# Patient Record
Sex: MALE | Race: WHITE | Employment: FULL TIME | URBAN - METROPOLITAN AREA
[De-identification: names, ages, dates, MRNs, and addresses within clinical notes are randomized per-mention and may not be internally consistent; named-entity substitution may affect disease eponyms.]

---

## 2019-07-05 ENCOUNTER — HOSPITAL ENCOUNTER (OUTPATIENT)
Dept: CT IMAGING | Age: 44
Discharge: HOME | End: 2019-07-05
Attending: ORTHOPAEDIC SURGERY
Payer: COMMERCIAL

## 2019-07-05 DIAGNOSIS — M43.16 SPONDYLOLISTHESIS OF LUMBAR REGION: ICD-10-CM

## 2019-07-05 PROCEDURE — 72131 CT LUMBAR SPINE W/O DYE: CPT

## 2019-07-24 ENCOUNTER — HOSPITAL ENCOUNTER (OUTPATIENT)
Dept: PREADMISSION TESTING | Age: 44
Discharge: HOME | End: 2019-07-24
Payer: COMMERCIAL

## 2019-07-24 VITALS
BODY MASS INDEX: 42.7 KG/M2 | TEMPERATURE: 98 F | WEIGHT: 305 LBS | HEART RATE: 71 BPM | HEIGHT: 71 IN | SYSTOLIC BLOOD PRESSURE: 119 MMHG | DIASTOLIC BLOOD PRESSURE: 86 MMHG

## 2019-07-24 DIAGNOSIS — E66.01 MORBID OBESITY WITH BMI OF 40.0-44.9, ADULT (HCC): Primary | ICD-10-CM

## 2019-07-24 LAB
ABO + RH BLD: NORMAL
APPEARANCE UR: CLEAR
BACTERIA URNS QL MICRO: NEGATIVE /HPF
BILIRUB UR QL: NEGATIVE
BLOOD GROUP ANTIBODIES SERPL: NORMAL
COLOR UR: NORMAL
EPITH CASTS URNS QL MICRO: NORMAL /LPF
EST. AVERAGE GLUCOSE BLD GHB EST-MCNC: 123 MG/DL
GLUCOSE UR STRIP.AUTO-MCNC: NEGATIVE MG/DL
HBA1C MFR BLD: 5.9 % (ref 4.2–6.3)
HGB UR QL STRIP: NEGATIVE
HYALINE CASTS URNS QL MICRO: NORMAL /LPF (ref 0–5)
INR PPP: 1 (ref 0.9–1.1)
KETONES UR QL STRIP.AUTO: NEGATIVE MG/DL
LEUKOCYTE ESTERASE UR QL STRIP.AUTO: NEGATIVE
NITRITE UR QL STRIP.AUTO: NEGATIVE
PH UR STRIP: 6 [PH] (ref 5–8)
PROT UR STRIP-MCNC: NEGATIVE MG/DL
PROTHROMBIN TIME: 10.1 SEC (ref 9–11.1)
RBC #/AREA URNS HPF: NORMAL /HPF (ref 0–5)
SP GR UR REFRACTOMETRY: 1.02 (ref 1–1.03)
SPECIMEN EXP DATE BLD: NORMAL
UA: UC IF INDICATED,UAUC: NORMAL
UROBILINOGEN UR QL STRIP.AUTO: 1 EU/DL (ref 0.2–1)
WBC URNS QL MICRO: NORMAL /HPF (ref 0–4)

## 2019-07-24 PROCEDURE — 83036 HEMOGLOBIN GLYCOSYLATED A1C: CPT

## 2019-07-24 PROCEDURE — 36415 COLL VENOUS BLD VENIPUNCTURE: CPT

## 2019-07-24 PROCEDURE — 86900 BLOOD TYPING SEROLOGIC ABO: CPT

## 2019-07-24 PROCEDURE — 85610 PROTHROMBIN TIME: CPT

## 2019-07-24 PROCEDURE — 81001 URINALYSIS AUTO W/SCOPE: CPT

## 2019-07-24 RX ORDER — CHOLECALCIFEROL (VITAMIN D3) 125 MCG
CAPSULE ORAL AS NEEDED
COMMUNITY

## 2019-07-24 NOTE — PERIOP NOTES
PREOPERATIVE INSTRUCTIONS REVIEWED WITH PATIENT. PATIENT GIVEN TWO--SIX BOTTLES CHG SOAP. INSTRUCTIONS REVIEWED ON USE OF CHG SOAP. PATIENT GIVEN SSI INFECTIONS SHEET; MRSA/MSSA TREATMENT INSTRUCTION SHEET GIVEN WITH AN EXPLANATION TO PATIENT THAT THEY WILL BE NOTIFIED IF TREATMENT INSTRUCTIONS NEED TO BE INITIATED. PATIENT WAS GIVEN THE OPPORTUNITY TO ASK QUESTIONS; REGARDING THE INFORMATION PROVIDED. PREOP DIET AND NUTRITION UPDATED GUIDELINES/ INSTRUCTIONS REVIEWED WITH PATIENT. PATIENT ADVISED THAT THEY MAY DRINK CLEAR LIQUIDS (12 OZ/4 HOURS) UP UNTIL ONE HOUR PRIOR TO ARRIVAL DAY OF SURGERY. PATIENT GIVEN INSTRUCTIONS REGARDING INCENTIVE SPIROMETRY USE IN PREOP SPINE CLASS. PATIENT ATTENDED PREOP SPINE CLASS TODAY.    1309 called EDUARDO RE: WEIGHT:  305 LBS.

## 2019-07-25 ENCOUNTER — ANESTHESIA EVENT (OUTPATIENT)
Dept: SURGERY | Age: 44
DRG: 454 | End: 2019-07-25
Payer: COMMERCIAL

## 2019-07-25 PROBLEM — E66.01 MORBID OBESITY WITH BMI OF 40.0-44.9, ADULT (HCC): Status: ACTIVE | Noted: 2019-07-25

## 2019-07-25 LAB
BACTERIA SPEC CULT: NORMAL
BACTERIA SPEC CULT: NORMAL
SERVICE CMNT-IMP: NORMAL

## 2019-07-31 ENCOUNTER — APPOINTMENT (OUTPATIENT)
Dept: GENERAL RADIOLOGY | Age: 44
DRG: 454 | End: 2019-07-31
Attending: ORTHOPAEDIC SURGERY
Payer: COMMERCIAL

## 2019-07-31 ENCOUNTER — HOSPITAL ENCOUNTER (INPATIENT)
Age: 44
LOS: 1 days | Discharge: HOME | DRG: 454 | End: 2019-08-01
Attending: ORTHOPAEDIC SURGERY | Admitting: ORTHOPAEDIC SURGERY
Payer: COMMERCIAL

## 2019-07-31 ENCOUNTER — ANESTHESIA (OUTPATIENT)
Dept: SURGERY | Age: 44
DRG: 454 | End: 2019-07-31
Payer: COMMERCIAL

## 2019-07-31 DIAGNOSIS — M48.061 SPINAL STENOSIS OF LUMBAR REGION WITHOUT NEUROGENIC CLAUDICATION: Primary | ICD-10-CM

## 2019-07-31 PROCEDURE — 74011250637 HC RX REV CODE- 250/637: Performed by: ORTHOPAEDIC SURGERY

## 2019-07-31 PROCEDURE — 77030004391 HC BUR FLUT MEDT -C: Performed by: ORTHOPAEDIC SURGERY

## 2019-07-31 PROCEDURE — 77030038808 HC SPCR TLIF HLLYWD NM INLC -I1: Performed by: ORTHOPAEDIC SURGERY

## 2019-07-31 PROCEDURE — 77030026438 HC STYL ET INTUB CARD -A: Performed by: NURSE ANESTHETIST, CERTIFIED REGISTERED

## 2019-07-31 PROCEDURE — 01NB0ZZ RELEASE LUMBAR NERVE, OPEN APPROACH: ICD-10-PCS | Performed by: ORTHOPAEDIC SURGERY

## 2019-07-31 PROCEDURE — 77030019908 HC STETH ESOPH SIMS -A: Performed by: NURSE ANESTHETIST, CERTIFIED REGISTERED

## 2019-07-31 PROCEDURE — 76010000175 HC OR TIME 4 TO 4.5 HR INTENSV-TIER 1: Performed by: ORTHOPAEDIC SURGERY

## 2019-07-31 PROCEDURE — 74011000272 HC RX REV CODE- 272: Performed by: ORTHOPAEDIC SURGERY

## 2019-07-31 PROCEDURE — 76210000016 HC OR PH I REC 1 TO 1.5 HR: Performed by: ORTHOPAEDIC SURGERY

## 2019-07-31 PROCEDURE — 77030008684 HC TU ET CUF COVD -B: Performed by: NURSE ANESTHETIST, CERTIFIED REGISTERED

## 2019-07-31 PROCEDURE — 72100 X-RAY EXAM L-S SPINE 2/3 VWS: CPT

## 2019-07-31 PROCEDURE — 77030012406 HC DRN WND PENRS BARD -A: Performed by: ORTHOPAEDIC SURGERY

## 2019-07-31 PROCEDURE — 77030040356 HC CORD BPLR FRCP COVD -A: Performed by: ORTHOPAEDIC SURGERY

## 2019-07-31 PROCEDURE — 77030029099 HC BN WAX SSPC -A: Performed by: ORTHOPAEDIC SURGERY

## 2019-07-31 PROCEDURE — 0SG3071 FUSION OF LUMBOSACRAL JOINT WITH AUTOLOGOUS TISSUE SUBSTITUTE, POSTERIOR APPROACH, POSTERIOR COLUMN, OPEN APPROACH: ICD-10-PCS | Performed by: ORTHOPAEDIC SURGERY

## 2019-07-31 PROCEDURE — 77030031139 HC SUT VCRL2 J&J -A: Performed by: ORTHOPAEDIC SURGERY

## 2019-07-31 PROCEDURE — 0SG0071 FUSION OF LUMBAR VERTEBRAL JOINT WITH AUTOLOGOUS TISSUE SUBSTITUTE, POSTERIOR APPROACH, POSTERIOR COLUMN, OPEN APPROACH: ICD-10-PCS | Performed by: ORTHOPAEDIC SURGERY

## 2019-07-31 PROCEDURE — 0SG30AJ FUSION OF LUMBOSACRAL JOINT WITH INTERBODY FUSION DEVICE, POSTERIOR APPROACH, ANTERIOR COLUMN, OPEN APPROACH: ICD-10-PCS | Performed by: ORTHOPAEDIC SURGERY

## 2019-07-31 PROCEDURE — C1713 ANCHOR/SCREW BN/BN,TIS/BN: HCPCS | Performed by: ORTHOPAEDIC SURGERY

## 2019-07-31 PROCEDURE — 77030018836 HC SOL IRR NACL ICUM -A: Performed by: ORTHOPAEDIC SURGERY

## 2019-07-31 PROCEDURE — 77030040361 HC SLV COMPR DVT MDII -B: Performed by: ORTHOPAEDIC SURGERY

## 2019-07-31 PROCEDURE — 77030035129: Performed by: ORTHOPAEDIC SURGERY

## 2019-07-31 PROCEDURE — 77030039266 HC ADH SKN EXOFIN S2SG -A: Performed by: ORTHOPAEDIC SURGERY

## 2019-07-31 PROCEDURE — 74011250636 HC RX REV CODE- 250/636

## 2019-07-31 PROCEDURE — 77030039789 HC GRFT BN DBM OSTEO INLC -G: Performed by: ORTHOPAEDIC SURGERY

## 2019-07-31 PROCEDURE — 74011000250 HC RX REV CODE- 250

## 2019-07-31 PROCEDURE — 77030003029 HC SUT VCRL J&J -B: Performed by: ORTHOPAEDIC SURGERY

## 2019-07-31 PROCEDURE — 74011250636 HC RX REV CODE- 250/636: Performed by: ORTHOPAEDIC SURGERY

## 2019-07-31 PROCEDURE — 8E0W0CZ ROBOTIC ASSISTED PROCEDURE OF TRUNK REGION, OPEN APPROACH: ICD-10-PCS | Performed by: ORTHOPAEDIC SURGERY

## 2019-07-31 PROCEDURE — 74011000250 HC RX REV CODE- 250: Performed by: ORTHOPAEDIC SURGERY

## 2019-07-31 PROCEDURE — 77030011264 HC ELECTRD BLD EXT COVD -A: Performed by: ORTHOPAEDIC SURGERY

## 2019-07-31 PROCEDURE — 07DR0ZZ EXTRACTION OF ILIAC BONE MARROW, OPEN APPROACH: ICD-10-PCS | Performed by: ORTHOPAEDIC SURGERY

## 2019-07-31 PROCEDURE — 77030020782 HC GWN BAIR PAWS FLX 3M -B

## 2019-07-31 PROCEDURE — 77030013079 HC BLNKT BAIR HGGR 3M -A: Performed by: NURSE ANESTHETIST, CERTIFIED REGISTERED

## 2019-07-31 PROCEDURE — 77030039456 HC KT SPINE DISP MAZR -G1: Performed by: ORTHOPAEDIC SURGERY

## 2019-07-31 PROCEDURE — 0ST40ZZ RESECTION OF LUMBOSACRAL DISC, OPEN APPROACH: ICD-10-PCS | Performed by: ORTHOPAEDIC SURGERY

## 2019-07-31 PROCEDURE — 77030005515 HC CATH URETH FOL14 BARD -B: Performed by: ORTHOPAEDIC SURGERY

## 2019-07-31 PROCEDURE — 74011250636 HC RX REV CODE- 250/636: Performed by: ANESTHESIOLOGY

## 2019-07-31 PROCEDURE — 76060000039 HC ANESTHESIA 4 TO 4.5 HR: Performed by: ORTHOPAEDIC SURGERY

## 2019-07-31 PROCEDURE — 77030037722 HC GRFT BN SUB BGNX -G1: Performed by: ORTHOPAEDIC SURGERY

## 2019-07-31 PROCEDURE — 77030014647 HC SEAL FBRN TISSL BAXT -D: Performed by: ORTHOPAEDIC SURGERY

## 2019-07-31 PROCEDURE — 65270000029 HC RM PRIVATE

## 2019-07-31 DEVICE — HOLLYWOOD VI NM STERILE 11MM X 30MM X 10MM, LORDOTIC
Type: IMPLANTABLE DEVICE | Site: SPINE LUMBAR | Status: FUNCTIONAL
Brand: SEASPINE SPACER SYSTEM - HOLLYWOOD VI NANOMETALENE

## 2019-07-31 DEVICE — Z DUP USE 2731811 GRAFT BNE LG OSTEOSTRAND: Type: IMPLANTABLE DEVICE | Site: SPINE LUMBAR | Status: FUNCTIONAL

## 2019-07-31 DEVICE — SET SCREW, T30
Type: IMPLANTABLE DEVICE | Site: SPINE LUMBAR | Status: FUNCTIONAL
Brand: TAURUS

## 2019-07-31 DEVICE — SCREW, PEDICLE DL CANNULATED, Ø6.5X50MM
Type: IMPLANTABLE DEVICE | Site: SPINE LUMBAR | Status: FUNCTIONAL
Brand: TAURUS

## 2019-07-31 DEVICE — SCREW, PEDICLE DL CANNULATED, Ø7.5X45MM
Type: IMPLANTABLE DEVICE | Site: SPINE LUMBAR | Status: FUNCTIONAL
Brand: TAURUS

## 2019-07-31 DEVICE — 5.5MM CURVED ROD 45MM TI ALLOY
Type: IMPLANTABLE DEVICE | Site: SPINE LUMBAR | Status: FUNCTIONAL
Brand: TAURUS

## 2019-07-31 DEVICE — HEADBODY, ASSEMBLY, STANDARD
Type: IMPLANTABLE DEVICE | Site: SPINE LUMBAR | Status: FUNCTIONAL
Brand: TAURUS

## 2019-07-31 RX ORDER — CYCLOBENZAPRINE HCL 10 MG
10 TABLET ORAL
Status: DISCONTINUED | OUTPATIENT
Start: 2019-07-31 | End: 2019-08-01 | Stop reason: HOSPADM

## 2019-07-31 RX ORDER — SODIUM CHLORIDE, SODIUM LACTATE, POTASSIUM CHLORIDE, CALCIUM CHLORIDE 600; 310; 30; 20 MG/100ML; MG/100ML; MG/100ML; MG/100ML
INJECTION, SOLUTION INTRAVENOUS
Status: DISCONTINUED | OUTPATIENT
Start: 2019-07-31 | End: 2019-07-31 | Stop reason: HOSPADM

## 2019-07-31 RX ORDER — SODIUM CHLORIDE, SODIUM LACTATE, POTASSIUM CHLORIDE, CALCIUM CHLORIDE 600; 310; 30; 20 MG/100ML; MG/100ML; MG/100ML; MG/100ML
50 INJECTION, SOLUTION INTRAVENOUS CONTINUOUS
Status: DISCONTINUED | OUTPATIENT
Start: 2019-07-31 | End: 2019-07-31 | Stop reason: HOSPADM

## 2019-07-31 RX ORDER — MORPHINE SULFATE 10 MG/ML
2 INJECTION, SOLUTION INTRAMUSCULAR; INTRAVENOUS
Status: DISCONTINUED | OUTPATIENT
Start: 2019-07-31 | End: 2019-07-31 | Stop reason: HOSPADM

## 2019-07-31 RX ORDER — ONDANSETRON 2 MG/ML
4 INJECTION INTRAMUSCULAR; INTRAVENOUS AS NEEDED
Status: DISCONTINUED | OUTPATIENT
Start: 2019-07-31 | End: 2019-07-31 | Stop reason: HOSPADM

## 2019-07-31 RX ORDER — DEXAMETHASONE SODIUM PHOSPHATE 4 MG/ML
INJECTION, SOLUTION INTRA-ARTICULAR; INTRALESIONAL; INTRAMUSCULAR; INTRAVENOUS; SOFT TISSUE AS NEEDED
Status: DISCONTINUED | OUTPATIENT
Start: 2019-07-31 | End: 2019-07-31 | Stop reason: HOSPADM

## 2019-07-31 RX ORDER — LIDOCAINE HYDROCHLORIDE 10 MG/ML
0.1 INJECTION, SOLUTION EPIDURAL; INFILTRATION; INTRACAUDAL; PERINEURAL AS NEEDED
Status: DISCONTINUED | OUTPATIENT
Start: 2019-07-31 | End: 2019-07-31 | Stop reason: HOSPADM

## 2019-07-31 RX ORDER — SUCCINYLCHOLINE CHLORIDE 20 MG/ML
INJECTION INTRAMUSCULAR; INTRAVENOUS AS NEEDED
Status: DISCONTINUED | OUTPATIENT
Start: 2019-07-31 | End: 2019-07-31 | Stop reason: HOSPADM

## 2019-07-31 RX ORDER — MIDAZOLAM HYDROCHLORIDE 1 MG/ML
INJECTION, SOLUTION INTRAMUSCULAR; INTRAVENOUS AS NEEDED
Status: DISCONTINUED | OUTPATIENT
Start: 2019-07-31 | End: 2019-07-31 | Stop reason: HOSPADM

## 2019-07-31 RX ORDER — FENTANYL CITRATE 50 UG/ML
INJECTION, SOLUTION INTRAMUSCULAR; INTRAVENOUS AS NEEDED
Status: DISCONTINUED | OUTPATIENT
Start: 2019-07-31 | End: 2019-07-31 | Stop reason: HOSPADM

## 2019-07-31 RX ORDER — FENTANYL CITRATE 50 UG/ML
25 INJECTION, SOLUTION INTRAMUSCULAR; INTRAVENOUS
Status: COMPLETED | OUTPATIENT
Start: 2019-07-31 | End: 2019-07-31

## 2019-07-31 RX ORDER — HYDROMORPHONE HYDROCHLORIDE 2 MG/ML
INJECTION, SOLUTION INTRAMUSCULAR; INTRAVENOUS; SUBCUTANEOUS AS NEEDED
Status: DISCONTINUED | OUTPATIENT
Start: 2019-07-31 | End: 2019-07-31 | Stop reason: HOSPADM

## 2019-07-31 RX ORDER — VANCOMYCIN HYDROCHLORIDE 1 G/20ML
INJECTION, POWDER, LYOPHILIZED, FOR SOLUTION INTRAVENOUS AS NEEDED
Status: DISCONTINUED | OUTPATIENT
Start: 2019-07-31 | End: 2019-07-31 | Stop reason: HOSPADM

## 2019-07-31 RX ORDER — OXYCODONE HYDROCHLORIDE 5 MG/1
5 TABLET ORAL
Status: DISCONTINUED | OUTPATIENT
Start: 2019-07-31 | End: 2019-08-01 | Stop reason: HOSPADM

## 2019-07-31 RX ORDER — BUPIVACAINE HYDROCHLORIDE AND EPINEPHRINE 5; 5 MG/ML; UG/ML
INJECTION, SOLUTION EPIDURAL; INTRACAUDAL; PERINEURAL AS NEEDED
Status: DISCONTINUED | OUTPATIENT
Start: 2019-07-31 | End: 2019-07-31 | Stop reason: HOSPADM

## 2019-07-31 RX ORDER — OXYCODONE HYDROCHLORIDE 5 MG/1
10 TABLET ORAL
Status: DISCONTINUED | OUTPATIENT
Start: 2019-07-31 | End: 2019-08-01 | Stop reason: HOSPADM

## 2019-07-31 RX ORDER — HYDROMORPHONE HYDROCHLORIDE 1 MG/ML
0.2 INJECTION, SOLUTION INTRAMUSCULAR; INTRAVENOUS; SUBCUTANEOUS
Status: DISCONTINUED | OUTPATIENT
Start: 2019-07-31 | End: 2019-07-31 | Stop reason: HOSPADM

## 2019-07-31 RX ORDER — DEXMEDETOMIDINE HYDROCHLORIDE 4 UG/ML
INJECTION, SOLUTION INTRAVENOUS AS NEEDED
Status: DISCONTINUED | OUTPATIENT
Start: 2019-07-31 | End: 2019-07-31 | Stop reason: HOSPADM

## 2019-07-31 RX ORDER — POLYETHYLENE GLYCOL 3350 17 G/17G
17 POWDER, FOR SOLUTION ORAL DAILY
Status: DISCONTINUED | OUTPATIENT
Start: 2019-08-01 | End: 2019-08-01 | Stop reason: HOSPADM

## 2019-07-31 RX ORDER — PROPOFOL 10 MG/ML
INJECTION, EMULSION INTRAVENOUS AS NEEDED
Status: DISCONTINUED | OUTPATIENT
Start: 2019-07-31 | End: 2019-07-31 | Stop reason: HOSPADM

## 2019-07-31 RX ORDER — SODIUM CHLORIDE 0.9 % (FLUSH) 0.9 %
5-40 SYRINGE (ML) INJECTION EVERY 8 HOURS
Status: DISCONTINUED | OUTPATIENT
Start: 2019-07-31 | End: 2019-08-01 | Stop reason: HOSPADM

## 2019-07-31 RX ORDER — ACETAMINOPHEN 325 MG/1
650 TABLET ORAL EVERY 6 HOURS
Status: DISCONTINUED | OUTPATIENT
Start: 2019-07-31 | End: 2019-08-01 | Stop reason: HOSPADM

## 2019-07-31 RX ORDER — SODIUM CHLORIDE 9 MG/ML
125 INJECTION, SOLUTION INTRAVENOUS CONTINUOUS
Status: DISCONTINUED | OUTPATIENT
Start: 2019-07-31 | End: 2019-08-01 | Stop reason: HOSPADM

## 2019-07-31 RX ORDER — DIPHENHYDRAMINE HCL 12.5MG/5ML
12.5 ELIXIR ORAL
Status: DISCONTINUED | OUTPATIENT
Start: 2019-07-31 | End: 2019-08-01 | Stop reason: HOSPADM

## 2019-07-31 RX ORDER — SODIUM CHLORIDE 0.9 % (FLUSH) 0.9 %
5-40 SYRINGE (ML) INJECTION EVERY 8 HOURS
Status: DISCONTINUED | OUTPATIENT
Start: 2019-07-31 | End: 2019-07-31 | Stop reason: HOSPADM

## 2019-07-31 RX ORDER — ROCURONIUM BROMIDE 10 MG/ML
INJECTION, SOLUTION INTRAVENOUS AS NEEDED
Status: DISCONTINUED | OUTPATIENT
Start: 2019-07-31 | End: 2019-07-31 | Stop reason: HOSPADM

## 2019-07-31 RX ORDER — CEFAZOLIN SODIUM/WATER 2 G/20 ML
2 SYRINGE (ML) INTRAVENOUS EVERY 8 HOURS
Status: COMPLETED | OUTPATIENT
Start: 2019-07-31 | End: 2019-08-01

## 2019-07-31 RX ORDER — AMOXICILLIN 250 MG
1 CAPSULE ORAL 2 TIMES DAILY
Status: DISCONTINUED | OUTPATIENT
Start: 2019-07-31 | End: 2019-08-01 | Stop reason: HOSPADM

## 2019-07-31 RX ORDER — FACIAL-BODY WIPES
10 EACH TOPICAL DAILY PRN
Status: DISCONTINUED | OUTPATIENT
Start: 2019-08-02 | End: 2019-08-01 | Stop reason: HOSPADM

## 2019-07-31 RX ORDER — SODIUM CHLORIDE 0.9 % (FLUSH) 0.9 %
5-40 SYRINGE (ML) INJECTION AS NEEDED
Status: DISCONTINUED | OUTPATIENT
Start: 2019-07-31 | End: 2019-07-31 | Stop reason: HOSPADM

## 2019-07-31 RX ORDER — SODIUM CHLORIDE 0.9 % (FLUSH) 0.9 %
5-40 SYRINGE (ML) INJECTION AS NEEDED
Status: DISCONTINUED | OUTPATIENT
Start: 2019-07-31 | End: 2019-08-01 | Stop reason: HOSPADM

## 2019-07-31 RX ORDER — HYDROMORPHONE HCL/0.9% NACL/PF 0.5 MG/ML
PLASTIC BAG, INJECTION (ML) INTRAVENOUS CONTINUOUS
Status: DISCONTINUED | OUTPATIENT
Start: 2019-07-31 | End: 2019-08-01 | Stop reason: HOSPADM

## 2019-07-31 RX ORDER — ONDANSETRON 2 MG/ML
INJECTION INTRAMUSCULAR; INTRAVENOUS AS NEEDED
Status: DISCONTINUED | OUTPATIENT
Start: 2019-07-31 | End: 2019-07-31 | Stop reason: HOSPADM

## 2019-07-31 RX ORDER — NALOXONE HYDROCHLORIDE 0.4 MG/ML
0.4 INJECTION, SOLUTION INTRAMUSCULAR; INTRAVENOUS; SUBCUTANEOUS AS NEEDED
Status: DISCONTINUED | OUTPATIENT
Start: 2019-07-31 | End: 2019-08-01 | Stop reason: HOSPADM

## 2019-07-31 RX ORDER — ONDANSETRON 2 MG/ML
4 INJECTION INTRAMUSCULAR; INTRAVENOUS
Status: DISCONTINUED | OUTPATIENT
Start: 2019-07-31 | End: 2019-08-01 | Stop reason: HOSPADM

## 2019-07-31 RX ORDER — LIDOCAINE HYDROCHLORIDE 20 MG/ML
INJECTION, SOLUTION EPIDURAL; INFILTRATION; INTRACAUDAL; PERINEURAL AS NEEDED
Status: DISCONTINUED | OUTPATIENT
Start: 2019-07-31 | End: 2019-07-31 | Stop reason: HOSPADM

## 2019-07-31 RX ADMIN — DEXMEDETOMIDINE HYDROCHLORIDE 4 MCG: 4 INJECTION, SOLUTION INTRAVENOUS at 14:50

## 2019-07-31 RX ADMIN — DEXMEDETOMIDINE HYDROCHLORIDE 4 MCG: 4 INJECTION, SOLUTION INTRAVENOUS at 14:52

## 2019-07-31 RX ADMIN — FENTANYL CITRATE 25 MCG: 50 INJECTION INTRAMUSCULAR; INTRAVENOUS at 15:45

## 2019-07-31 RX ADMIN — CEFAZOLIN 3 G: 1 INJECTION, POWDER, FOR SOLUTION INTRAMUSCULAR; INTRAVENOUS; PARENTERAL at 11:23

## 2019-07-31 RX ADMIN — ROCURONIUM BROMIDE 5 MG: 10 INJECTION, SOLUTION INTRAVENOUS at 11:13

## 2019-07-31 RX ADMIN — HYDROMORPHONE HYDROCHLORIDE 0.5 MG: 2 INJECTION, SOLUTION INTRAMUSCULAR; INTRAVENOUS; SUBCUTANEOUS at 14:34

## 2019-07-31 RX ADMIN — PROPOFOL 300 MG: 10 INJECTION, EMULSION INTRAVENOUS at 11:13

## 2019-07-31 RX ADMIN — SODIUM CHLORIDE, SODIUM LACTATE, POTASSIUM CHLORIDE, CALCIUM CHLORIDE: 600; 310; 30; 20 INJECTION, SOLUTION INTRAVENOUS at 14:12

## 2019-07-31 RX ADMIN — SODIUM CHLORIDE, SODIUM LACTATE, POTASSIUM CHLORIDE, AND CALCIUM CHLORIDE 50 ML/HR: 600; 310; 30; 20 INJECTION, SOLUTION INTRAVENOUS at 10:13

## 2019-07-31 RX ADMIN — DEXMEDETOMIDINE HYDROCHLORIDE 4 MCG: 4 INJECTION, SOLUTION INTRAVENOUS at 14:58

## 2019-07-31 RX ADMIN — MIDAZOLAM HYDROCHLORIDE 2 MG: 1 INJECTION, SOLUTION INTRAMUSCULAR; INTRAVENOUS at 11:11

## 2019-07-31 RX ADMIN — DEXAMETHASONE SODIUM PHOSPHATE 8 MG: 4 INJECTION, SOLUTION INTRA-ARTICULAR; INTRALESIONAL; INTRAMUSCULAR; INTRAVENOUS; SOFT TISSUE at 11:25

## 2019-07-31 RX ADMIN — FENTANYL CITRATE 50 MCG: 50 INJECTION, SOLUTION INTRAMUSCULAR; INTRAVENOUS at 12:28

## 2019-07-31 RX ADMIN — SODIUM CHLORIDE, SODIUM LACTATE, POTASSIUM CHLORIDE, CALCIUM CHLORIDE: 600; 310; 30; 20 INJECTION, SOLUTION INTRAVENOUS at 10:58

## 2019-07-31 RX ADMIN — SENNOSIDES, DOCUSATE SODIUM 1 TABLET: 50; 8.6 TABLET, FILM COATED ORAL at 17:12

## 2019-07-31 RX ADMIN — SUCCINYLCHOLINE CHLORIDE 180 MG: 20 INJECTION INTRAMUSCULAR; INTRAVENOUS at 11:13

## 2019-07-31 RX ADMIN — ACETAMINOPHEN 650 MG: 325 TABLET ORAL at 17:12

## 2019-07-31 RX ADMIN — DEXMEDETOMIDINE HYDROCHLORIDE 4 MCG: 4 INJECTION, SOLUTION INTRAVENOUS at 14:54

## 2019-07-31 RX ADMIN — Medication: at 16:10

## 2019-07-31 RX ADMIN — FENTANYL CITRATE 25 MCG: 50 INJECTION INTRAMUSCULAR; INTRAVENOUS at 15:57

## 2019-07-31 RX ADMIN — ONDANSETRON 4 MG: 2 INJECTION INTRAMUSCULAR; INTRAVENOUS at 14:03

## 2019-07-31 RX ADMIN — DEXMEDETOMIDINE HYDROCHLORIDE 4 MCG: 4 INJECTION, SOLUTION INTRAVENOUS at 14:56

## 2019-07-31 RX ADMIN — MIDAZOLAM HYDROCHLORIDE 3 MG: 1 INJECTION, SOLUTION INTRAMUSCULAR; INTRAVENOUS at 11:05

## 2019-07-31 RX ADMIN — DEXMEDETOMIDINE HYDROCHLORIDE 4 MCG: 4 INJECTION, SOLUTION INTRAVENOUS at 14:48

## 2019-07-31 RX ADMIN — ACETAMINOPHEN 650 MG: 325 TABLET ORAL at 23:23

## 2019-07-31 RX ADMIN — HYDROMORPHONE HYDROCHLORIDE 1 MG: 2 INJECTION, SOLUTION INTRAMUSCULAR; INTRAVENOUS; SUBCUTANEOUS at 11:49

## 2019-07-31 RX ADMIN — FENTANYL CITRATE 100 MCG: 50 INJECTION, SOLUTION INTRAMUSCULAR; INTRAVENOUS at 11:13

## 2019-07-31 RX ADMIN — Medication 2 G: at 19:36

## 2019-07-31 RX ADMIN — LIDOCAINE HYDROCHLORIDE 100 MG: 20 INJECTION, SOLUTION EPIDURAL; INFILTRATION; INTRACAUDAL; PERINEURAL at 11:13

## 2019-07-31 RX ADMIN — FENTANYL CITRATE 25 MCG: 50 INJECTION INTRAMUSCULAR; INTRAVENOUS at 15:50

## 2019-07-31 RX ADMIN — FENTANYL CITRATE 25 MCG: 50 INJECTION INTRAMUSCULAR; INTRAVENOUS at 15:40

## 2019-07-31 RX ADMIN — SODIUM CHLORIDE 125 ML/HR: 900 INJECTION, SOLUTION INTRAVENOUS at 16:15

## 2019-07-31 RX ADMIN — HYDROMORPHONE HYDROCHLORIDE 0.5 MG: 2 INJECTION, SOLUTION INTRAMUSCULAR; INTRAVENOUS; SUBCUTANEOUS at 14:55

## 2019-07-31 NOTE — PERIOP NOTES
Patient: Joanna Rosa MRN: [id number]  SSN: [id number]   YOB: 1975  Age: 40 y.o. Sex: male     Patient is status post Procedure(s):  L5-S1 DECOMPRESSION AND FUSION WITH MAZOR. Surgeon(s) and Role:     * Cindy Luna MD - Primary    Local/Dose/Irrigation:                    Peripheral IV 07/31/19 Left;Posterior Hand (Active)   Site Assessment Clean, dry, & intact 7/31/2019 10:12 AM   Phlebitis Assessment 0 7/31/2019 10:12 AM   Infiltration Assessment 0 7/31/2019 10:12 AM   Dressing Status Clean, dry, & intact; New 7/31/2019 10:12 AM   Dressing Type Transparent;Tape 7/31/2019 10:12 AM   Hub Color/Line Status Green; Infusing 7/31/2019 10:12 AM          Hemovac Right;Posterior Back (Active)      Airway - Endotracheal Tube 07/31/19 Oral (Active)                   Dressing/Packing:  Wound Back-Dressing Type: 4 x 4;Topical skin adhesive/glue (07/31/19 1428)    Splint/Cast:  ]    Other:

## 2019-07-31 NOTE — ANESTHESIA PREPROCEDURE EVALUATION
Relevant Problems   No relevant active problems       Anesthetic History   No history of anesthetic complications            Review of Systems / Medical History  Patient summary reviewed, nursing notes reviewed and pertinent labs reviewed    Pulmonary  Within defined limits                 Neuro/Psych   Within defined limits          Comments: Back pain Cardiovascular                  Exercise tolerance: >4 METS     GI/Hepatic/Renal  Within defined limits              Endo/Other        Morbid obesity     Other Findings              Physical Exam    Airway  Mallampati: I  TM Distance: > 6 cm  Neck ROM: normal range of motion   Mouth opening: Normal     Cardiovascular    Rhythm: regular  Rate: normal         Dental  No notable dental hx       Pulmonary  Breath sounds clear to auscultation               Abdominal         Other Findings            Anesthetic Plan    ASA: 2  Anesthesia type: general          Induction: Intravenous  Anesthetic plan and risks discussed with: Patient

## 2019-07-31 NOTE — BRIEF OP NOTE
BRIEF OPERATIVE NOTE    Date of Procedure: 7/31/2019   Preoperative Diagnosis: LUMBAR SPONDYLOLITHIASIS AND STENOSIS  Postoperative Diagnosis: LUMBAR SPONDYLOLITHIASIS AND STENOSIS    Procedure(s):  L5-S1 DECOMPRESSION AND FUSION WITH KEYUR  Surgeon(s) and Role:     * Ruby Tate MD - Primary         Surgical Assistant: Daria Faria    Surgical Staff:  Circ-1: Noemi Sacks  Circ-Relief: Molly Gallegos RN  Physician Assistant: SABIHA Gorman  Scrub Tech-1: Kely Dutta RN-Relief: Molly Gallegos RN  Event Time In Time Out   Incision Start 1151    Incision Close       Anesthesia: General   Estimated Blood Loss: 200cc  Specimens: * No specimens in log *   Findings: none   Complications: none  Implants:   Implant Name Type Inv.  Item Serial No.  Lot No. LRB No. Used Action   SCR SET SPNE T30 -- ZOEY - SNA  SCR SET SPNE T30 -- ZOEY NA [employer] US SPINE NA N/A 4 Implanted   ASSEMBLY HEADBODY STD -- ZOEY - SNA  ASSEMBLY HEADBODY STD -- ZOEY NA [employer] SPINE NA N/A 4 Implanted   SCR PEDCL 2LD ALLISON 6.5X50MM -- ZOEY - SNA  SCR PEDCL 2LD ALLISON 6.5X50MM -- ZOEY NA [employer] US SPINE NA N/A 2 Implanted   SCR PEDCL 2LD ALLISON 7.5X45MM -- ZOEY - SNA  SCR PEDCL 2LD ALLISON 7.5X45MM -- ZOEY NA [employer] US SPINE NA N/A 2 Implanted   Osteostrand large   [id number] SEASPINE [id number] N/A 1 Implanted   Osteostrand large   [id number] SEASPINE [id number] N/A 1 Implanted   Morpheus putty   NA BIOGENNIX [id number] N/A 1 Implanted   SPACER TLIF VI 8D [id number] -- YING VI NM STRL - SNA  SPACER TLIF VI 8D [id number] -- YING VI NM STRL NA [employer] SEASPINE [id number] N/A 1 Implanted   TALHA SPNE CRV 5.5X45MM --  - SNA  TALHA SPNE CRV 5.5X45MM --  NA [employer] US SPINE NA N/A 2 Implanted

## 2019-07-31 NOTE — ANESTHESIA POSTPROCEDURE EVALUATION
Post-Anesthesia Evaluation and Assessment    Patient: Glendell Hammans MRN: [id number]  SSN: [id number]    YOB: 1975  Age: 40 y.o. Sex: male      I have evaluated the patient and they are stable and ready for discharge from the PACU. Cardiovascular Function/Vital Signs  Visit Vitals  /70   Pulse 94   Temp 36.7 °C (98 °F)   Resp 19   Ht 5' 11\" (1.803 m)   Wt 138.3 kg (305 lb)   SpO2 94%   BMI 42.54 kg/m²       Patient is status post General anesthesia for Procedure(s):  L5-S1 DECOMPRESSION AND FUSION WITH MAZOR. Nausea/Vomiting: None    Postoperative hydration reviewed and adequate. Pain:  Pain Scale 1: Numeric (0 - 10) (07/31/19 0951)  Pain Intensity 1: 6 (07/31/19 0951)   Managed    Neurological Status:   Neuro (WDL): Exceptions to WDL (07/31/19 7592)  Neuro  LLE Motor Response: Numbness(leg to toes) (07/31/19 0949)   At baseline    Mental Status, Level of Consciousness: Alert and  oriented to person, place, and time    Pulmonary Status:   O2 Device: Nasal cannula (07/31/19 1523)   Adequate oxygenation and airway patent    Complications related to anesthesia: None    Post-anesthesia assessment completed. No concerns    Signed By: Heide Lennox, MD     July 31, 2019              Procedure(s):  L5-S1 DECOMPRESSION AND FUSION WITH MAZOR. general    <BSHSIANPOST>    Vitals Value Taken Time   /75 7/31/2019  3:30 PM   Temp 36.7 °C (98 °F) 7/31/2019  3:23 PM   Pulse 83 7/31/2019  3:31 PM   Resp 19 7/31/2019  3:31 PM   SpO2 92 % 7/31/2019  3:31 PM   Vitals shown include unvalidated device data.

## 2019-07-31 NOTE — PERIOP NOTES
1210:  Patient's family called to notify of start of procedure and status update; cell phone was not answered. 1300:  Patient's family called to notify of start of procedure and status update; spoke to wife.

## 2019-07-31 NOTE — PERIOP NOTES
TRANSFER - OUT REPORT:    Verbal report given to Elizabeth Luther RN(name) on Dayana Schaffer  being transferred to (unit) for routine post - op       Report consisted of patients Situation, Background, Assessment and   Recommendations(SBAR). Time Pre op antibiotic given:1123  Anesthesia Stop time: 1348  Banks Present on Transfer to floor:yes  Order for Banks on Chart:yes  Discharge Prescriptions with Chart:no    Information from the following report(s) SBAR, Kardex, OR Summary, Procedure Summary, Intake/Output, MAR, Accordion and Cardiac Rhythm NSR was reviewed with the receiving nurse. Opportunity for questions and clarification was provided. Is the patient on 02? YES       L/Min 3      Is the patient on a monitor? NO    Is the nurse transporting with the patient? NO    Surgical Waiting Area notified of patient's transfer from PACU? YES      The following personal items collected during your admission accompanied patient upon transfer:   Dental Appliance:    Vision:    Hearing Aid:    Jewelry: Jewelry: None  Clothing: Clothing: With patient(clothing and brace returned to patient )  Other Valuables:  Other Valuables: Brace  Valuables sent to safe:

## 2019-08-01 VITALS
HEIGHT: 71 IN | OXYGEN SATURATION: 95 % | BODY MASS INDEX: 42.7 KG/M2 | RESPIRATION RATE: 16 BRPM | TEMPERATURE: 98.4 F | HEART RATE: 92 BPM | SYSTOLIC BLOOD PRESSURE: 132 MMHG | WEIGHT: 305 LBS | DIASTOLIC BLOOD PRESSURE: 80 MMHG

## 2019-08-01 LAB
ANION GAP SERPL CALC-SCNC: 7 MMOL/L (ref 5–15)
BUN SERPL-MCNC: 11 MG/DL (ref 6–20)
BUN/CREAT SERPL: 13 (ref 12–20)
CALCIUM SERPL-MCNC: 8 MG/DL (ref 8.5–10.1)
CHLORIDE SERPL-SCNC: 107 MMOL/L (ref 97–108)
CO2 SERPL-SCNC: 27 MMOL/L (ref 21–32)
CREAT SERPL-MCNC: 0.85 MG/DL (ref 0.7–1.3)
GLUCOSE SERPL-MCNC: 135 MG/DL (ref 65–100)
HGB BLD-MCNC: 13.5 G/DL (ref 12.1–17)
POTASSIUM SERPL-SCNC: 4.2 MMOL/L (ref 3.5–5.1)
SODIUM SERPL-SCNC: 141 MMOL/L (ref 136–145)

## 2019-08-01 PROCEDURE — 36415 COLL VENOUS BLD VENIPUNCTURE: CPT

## 2019-08-01 PROCEDURE — 97161 PT EVAL LOW COMPLEX 20 MIN: CPT

## 2019-08-01 PROCEDURE — 74011250637 HC RX REV CODE- 250/637: Performed by: ORTHOPAEDIC SURGERY

## 2019-08-01 PROCEDURE — 97535 SELF CARE MNGMENT TRAINING: CPT | Performed by: OCCUPATIONAL THERAPIST

## 2019-08-01 PROCEDURE — 74011250636 HC RX REV CODE- 250/636: Performed by: ORTHOPAEDIC SURGERY

## 2019-08-01 PROCEDURE — 97116 GAIT TRAINING THERAPY: CPT

## 2019-08-01 PROCEDURE — 80048 BASIC METABOLIC PNL TOTAL CA: CPT

## 2019-08-01 PROCEDURE — 97165 OT EVAL LOW COMPLEX 30 MIN: CPT | Performed by: OCCUPATIONAL THERAPIST

## 2019-08-01 PROCEDURE — 85018 HEMOGLOBIN: CPT

## 2019-08-01 RX ORDER — OXYCODONE HYDROCHLORIDE 5 MG/1
5-10 TABLET ORAL
Qty: 60 TAB | Refills: 0 | Status: SHIPPED | OUTPATIENT
Start: 2019-08-01 | End: 2019-08-06

## 2019-08-01 RX ORDER — NALOXONE HYDROCHLORIDE 4 MG/.1ML
SPRAY NASAL
Qty: 1 EACH | Refills: 0 | Status: SHIPPED | OUTPATIENT
Start: 2019-08-01

## 2019-08-01 RX ORDER — AMOXICILLIN 250 MG
1 CAPSULE ORAL 2 TIMES DAILY
Qty: 30 TAB | Refills: 0 | Status: SHIPPED | OUTPATIENT
Start: 2019-08-01

## 2019-08-01 RX ADMIN — Medication 10 ML: at 07:32

## 2019-08-01 RX ADMIN — SENNOSIDES, DOCUSATE SODIUM 1 TABLET: 50; 8.6 TABLET, FILM COATED ORAL at 08:44

## 2019-08-01 RX ADMIN — ACETAMINOPHEN 650 MG: 325 TABLET ORAL at 11:17

## 2019-08-01 RX ADMIN — Medication 2 G: at 03:30

## 2019-08-01 RX ADMIN — OXYCODONE HYDROCHLORIDE 10 MG: 5 TABLET ORAL at 09:56

## 2019-08-01 RX ADMIN — POLYETHYLENE GLYCOL 3350 17 G: 17 POWDER, FOR SOLUTION ORAL at 08:45

## 2019-08-01 RX ADMIN — ACETAMINOPHEN 650 MG: 325 TABLET ORAL at 07:32

## 2019-08-01 NOTE — PROGRESS NOTES
8/1/19; 16:45 -   CM verified that [address] [place] [address] has accepted patient. CM submitted update to agency via [employer] to update that patient discharged today, 8/1.   CRM: Munir Christianson, MPH, [facility]; Z: [phone]

## 2019-08-01 NOTE — PROGRESS NOTES
Orthopedic Spine Progress Note  Angelicademetrius Delucas, AGACNP-BC  Work Cell: [phone]    Post Op Day: 1 Day Post-Op    2019 9:31 AM     Rubin Smith    HPI:      Rubin Smith is a 40 y.o. male with prior medical history notable for morbid obesity and chronic back pain. He developed diffuse numbness in the L5 distribution in the left lower extremity and a foot drop. Imaging confirmed retrolisthesis at L5-S1 with foraminal disc herniation at this level. After a discussion of the risks, benefits, and alternatives, Fish Felipe consented to undergo a  Procedure(s):  L5-S1 DECOMPRESSION AND FUSION WITH MAZOR    Subjective      Patient reports surgical pain 2/10. Foot drop resolved. No dysesthesias in his legs. Patient denies headache, dizziness, chest pain, sob, abdominal pain, fever, chills, or nausea/vomiting. Objective:     Physical Exam:  General:  Alert and oriented. No acute distress. Respiratory:  Breathing unlabored. Equal chest expansion   Abdomen:   CV: Soft, non-tender, non-distended  No edema appreciated in the extremities   Neurologic:      Musculoskeletal:  Speech fluent. No facial droop. Follows commands. PIZARRO/SILT Motor: 5/5 in hip flexion, knee extension, plantar flexion bilaterally. Left dorsiflexion 4/5, right dorsiflexion 5/5. Gait deferred  Calves soft, nontender upon palpation and with passive stretch. Moves both upper and lower extremities. Incision: clean, dry, and intact. No significant erythema or swelling. No active drainage noted.              Vital Signs:    Patient Vitals for the past 8 hrs:   BP Temp Pulse Resp SpO2   19 0844 115/70 98 °F (36.7 °C) 90 16 96 %   19 0406 117/75 98.1 °F (36.7 °C) 75 16 97 %     Temp (24hrs), Av.8 °F (36.6 °C), Min:97.3 °F (36.3 °C), Max:98.1 °F (36.7 °C)      Intake/Output:  701 - 1900  In: -   Out: 2727 [RNLRI:7040]  1901 - 700  In: 1400 [I.V.:1400]  Out:  [YEXKY:8261]    Pain Control:   Pain Assessment  Pain Scale 1: Numeric (0 - 10)  Pain Intensity 1: 2  Pain Onset 1: postop  Pain Location 1: Back  Pain Orientation 1: Lower  Pain Description 1: Aching  Pain Intervention(s) 1: Encouraged PCA    LAB:    Recent Labs     08/01/19  0340   HGB 13.5     Lab Results   Component Value Date/Time    Sodium 141 08/01/2019 03:40 AM    Potassium 4.2 08/01/2019 03:40 AM    Chloride 107 08/01/2019 03:40 AM    CO2 27 08/01/2019 03:40 AM    Glucose 135 (H) 08/01/2019 03:40 AM    BUN 11 08/01/2019 03:40 AM    Creatinine 0.85 08/01/2019 03:40 AM    Calcium 8.0 (L) 08/01/2019 03:40 AM       PT/OT:   Gait:                      Assessment/Plan      1. 1 Day Post-Op Procedure(s):  L5-S1 DECOMPRESSION AND FUSION WITH MAZOR   -Continue PT/OT. lso brace when OOB   -Pain control- d/c pca, transition to scheduled tylenol, prn oxycodone, ice to back   -d/c hemovac and rosado   -Incentive Spirometer   -Tolerating diet   -VTE Prophylaxes - TEDS & CDs    2. Morbid obesity   -Body mass index is 42.54 kg/m². -counseled. Refer to nutrition outpatient    Plan above discussed with Dr. Celia Hayes    Discharge To:   Home today vs tomorrow    Signed By: Leonor Das NP

## 2019-08-01 NOTE — PROGRESS NOTES
MORRIS: POD 1 L5-S1 Decompression and fusion with MAZOR, h/o morbid obesity, chronic back pain    Reason for Admission:  Lumbar Spondylolithiasis and stenosis                    RRAT Score:  2/ low                 Plan for utilizing home health:  Has not used HH in past. TBD by PT/OT. Current Advanced Directive/Advance Care Plan: none                         Transition of Care Plan:  CM will follow for any discharge needs. Will send [facility] referral if needed. Pt in agreement to use [facility]. Care Management Interventions  PCP Verified by CM: Yes(BALJINDER Rowland MD)  Last Visit to PCP: 06/27/19  Palliative Care Criteria Met (RRAT>21 & CHF Dx)?: No  Mode of Transport at Discharge: Other (see comment)(spouse)  Physical Therapy Consult: Yes  Occupational Therapy Consult: Yes  Current Support Network: Lives with Spouse(Jenny Felipe, [phone])  Plan discussed with Pt/Family/Caregiver: Yes  [address] Provided?: No  Discharge Location  Discharge Placement: Arturo Sanchez RN ACM CRM    Referral sent to [facility] via [address]. [facility] unable to staff case until next week on Tuesday. CM sent a referral to [facility] via [address] accept, out of network. [facility] and [facility]

## 2019-08-01 NOTE — PROGRESS NOTES
PHYSICAL THERAPY TREATMENT  Patient: Rubin Smith (37 y.o. male)  Date: 8/1/2019  Precautions:    Chart, physical therapy assessment, plan of care and goals were reviewed. ASSESSMENT:  Patient cleared stairs this PM x 8 steps with bilat railings and with single railing. Slow but steady gait pattern. Supervised with bed mobility and adhering well to sternal precautions. Cleared for safe discharge home from a physical standpoint. Recommend home health PT follow up. Progression toward goals:  ?      Improving appropriately and progressing toward goals  ? Improving slowly and progressing toward goals  ? Not making progress toward goals and plan of care will be adjusted     PLAN:  Patient continues to benefit from skilled intervention to address the above impairments. Continue treatment per established plan of care. Discharge Recommendations:  Home Health  Further Equipment Recommendations for Discharge:  Getting 3-in-1 commode from [facility] medical      SUBJECTIVE:   Patient stated I think I can handle things at home.    The patient stated 3/3 back precautions. Reviewed all 3 with patient. OBJECTIVE DATA SUMMARY:   Functional Mobility Training:  Bed Mobility:  Log    Supine to Sit: Minimum assistance;Assist x1;Additional time; Adaptive equipment  Sit to Supine: Minimum assistance; Additional time     Brace donned with  supervision/set-up     Transfers:  Sit to Stand: Minimum assistance;Assist x1;Additional time; Adaptive equipment  Stand to Sit: Minimum assistance;Assist x1  Bed to Chair: Contact guard assistance     Ambulation/Gait Training:  Distance (ft): 120 Feet (ft)  Assistive Device: Gait belt(quick draw lumbar brace)  Ambulation - Level of Assistance: Contact guard assistance  Gait Abnormalities: Decreased step clearance  Base of Support: Widened  Speed/Maru: Slow;Shuffled  Step Length: Right shortened;Left shortened    Stairs:  Number of Stairs Trained: 8  Stairs - Level of Assistance: Stand-by assistance  Rail Use: Both(and left only)    Pain:  Pain Scale 1: Numeric (0 - 10)  Pain Intensity 1: 2  Pain Location 1: Back  Pain Orientation 1: Lower  Pain Description 1: Aching  Pain Intervention(s) 1: Encouraged PCA    Activity Tolerance:   Please refer to the flowsheet for vital signs taken during this treatment. After treatment:   ?  Patient left in no apparent distress sitting up in chair  ? Patient left in no apparent distress in bed  ? Call bell left within reach  ? Nursing notified  ? Caregiver present  ?   Bed alarm activated    COMMUNICATION/COLLABORATION:   The patients plan of care was discussed with: Registered Nurse    Stefanie Curling, PT, DPT  Geriatric Clinical Specialist     Time Calculation: 27 mins

## 2019-08-01 NOTE — PROGRESS NOTES
PHYSICAL THERAPY EVALUATION Spine  Patient: Louellen Carrel [de-identified]40 y.o. male)  Date: 8/1/2019  Primary Diagnosis: Spinal stenosis of lumbar region [M48.061]  Procedure(s) (LRB):  L5-S1 DECOMPRESSION AND FUSION WITH MAZOR (N/A) 1 Day Post-Op   Precautions: TLSO       ASSESSMENT :  Based on the objective data described below, the patient presents with 3/10 lumbar pain and decreased functional independence. However patient is moving well and L foot drop has resolved. Patient is motivated to return to independent level. Biggest concern is 25 steps to enter his apartment upon discharge. Recommend home discharge with HHPT vs none pending progress with acute care PT. Patient will benefit from skilled intervention to address the above impairments. Patients rehabilitation potential is considered to be Good  Factors which may influence rehabilitation potential include:   ? None noted  ? Mental ability/status  ? Medical condition  ? Home/family situation and support systems  ? Safety awareness  ? Pain tolerance/management  ? Other:      PLAN :  Recommendations and Planned Interventions:  ?           Bed Mobility Training             ? Neuromuscular Re-Education  ? Transfer Training                   ? Orthotic/Prosthetic Training  ? Gait Training                         ? Modalities  ? Therapeutic Exercises           ? Edema Management/Control  ? Therapeutic Activities            ? Patient and Family Training/Education  ? Other (comment):    Frequency/Duration: Patient will be followed by physical therapy  twice daily to address goals. Discharge Recommendations: Home Health vs none, pending progress  Further Equipment Recommendations for Discharge: none      SUBJECTIVE:   Patient stated I want to be out of that bed whenever I can.     OBJECTIVE DATA SUMMARY:   HISTORY:    Past Medical History: Diagnosis Date    Morbid obesity with BMI of 40.0-44.9, adult ([facility] [address] 7/25/2019     Past Surgical History:   Procedure Laterality Date    HX APPENDECTOMY      HX CHOLECYSTECTOMY  2003    LAPAROSCOPIC    HX [place]STRIC [place]PASS  [place] ([place]    HX HEENT  [facility]    [facility])    HX HEENT  2011    LASIK EYE SX.    HX HERNIA REPAIR  1975    HX UROLOGICAL Right 1992    kidney stone     Prior Level of Function/Home Situation: Lives in third level walk up with family members. Previously independent with mobility without device. No falls reported  Personal factors and/or comorbidities impacting plan of care:     Home Situation  Home Environment: Apartment  # Steps to Enter: 25  Rails to Enter: Yes  Hand Rails : Bilateral  One/Two Story Residence: One story  Living Alone: No  Support Systems: Spouse/Significant Other/Partner  Patient Expects to be Discharged to[de-identified] Apartment  Current DME Used/Available at Home: None    EXAMINATION/PRESENTATION/DECISION MAKING:     Hearing: Auditory  Auditory Impairment: None     Strength:    Strength: Generally decreased, functional     Tone & Sensation:   Tone: Normal  Sensation: Intact      Coordination:  Coordination: Within functional limits    Functional Mobility:  Bed Mobility:  Supine to Sit: Minimum assistance;Assist x1;Additional time; Adaptive equipment     Transfers:  Sit to Stand: Minimum assistance;Assist x1;Additional time; Adaptive equipment  Stand to Sit: Minimum assistance;Assist x1  Bed to Chair: Contact guard assistance     Balance:   Sitting: Intact  Standing: Intact    Ambulation/Gait Training:  Distance (ft): 120 Feet (ft)  Assistive Device: Gait belt(quick draw lumbar brace)  Ambulation - Level of Assistance: Contact guard assistance  Gait Abnormalities: Decreased step clearance  Base of Support: Widened  Speed/Maru: Slow;Shuffled  Step Length: Right shortened;Left shortened    Functional Measure:  Tinetti test:    Sitting Balance: 1  Arises: 1  Attempts to Rise: 2  Immediate Standing Balance: 2  Standing Balance: 1  Nudged: 2  Eyes Closed: 1  Turn 360 Degrees - Continuous/Discontinuous: 1  Turn 360 Degrees - Steady/Unsteady: 1  Sitting Down: 1  Balance Score: 13  Indication of Gait: 1  R Step Length/Height: 0  L Step Length/Height: 0  R Foot Clearance: 1  L Foot Clearance: 1  Step Symmetry: 1  Step Continuity: 1  Path: 2  Trunk: 2  Walking Time: 0  Gait Score: 9  Total Score: 22         Tinetti Tool Score Risk of Falls  <19 = High Fall Risk  19-24 = Moderate Fall Risk  25-28 = Low Fall Risk  Tinetti ME. Performance-Oriented Assessment of Mobility Problems in Elderly Patients. [facility] 66; [id number]. (Scoring Description: PT Bulletin Feb. 10, 1993)    Older adults: Martha Guerra et al, 2009; n = 1000 [facility] elderly evaluated with ABC, PEDRO, ADL, and IADL)  · Mean PEDRO score for males aged 69-68 years = 26.21(3.40)  · Mean PEDRO score for females age 69-68 years = 25.16(4.30)  · Mean PEDRO score for males over 80 years = 23.29(6.02)  · Mean PEDRO score for females over 80 years = 17.20(8.32)           Physical Therapy Evaluation Charge Determination   History Examination Presentation Decision-Making   MEDIUM  Complexity : 1-2 comorbidities / personal factors will impact the outcome/ POC  LOW Complexity : 1-2 Standardized tests and measures addressing body structure, function, activity limitation and / or participation in recreation  LOW Complexity : Stable, uncomplicated  LOW Complexity : FOTO score of       Based on the above components, the patient evaluation is determined to be of the following complexity level: LOW     Pain:  Pain Scale 1: Numeric (0 - 10)  Pain Intensity 1: 2  Pain Location 1: Back  Pain Orientation 1: Lower  Pain Description 1: Aching  Pain Intervention(s) 1: Encouraged PCA  Activity Tolerance:   Vitals stable throughout  Please refer to the flowsheet for vital signs taken during this treatment.   After treatment:   ?         Patient left in no apparent distress sitting up in chair  ? Patient left in no apparent distress in bed  ? Call bell left within reach  ? Nursing notified  ? Caregiver present  ? Bed alarm activated    COMMUNICATION/EDUCATION:   The patients plan of care was discussed with: Occupational Therapist and Registered Nurse. ?         Fall prevention education was provided and the patient/caregiver indicated understanding. ? Patient/family have participated as able in goal setting and plan of care. ?         Patient/family agree to work toward stated goals and plan of care. ?         Patient understands intent and goals of therapy, but is neutral about his/her participation. ? Patient is unable to participate in goal setting and plan of care.     Thank you for this referral.  Khushbu Larry, PT, DPT  Geriatric Clinical Specialist     Time Calculation: 29 mins

## 2019-08-01 NOTE — PROGRESS NOTES
Orthopedic Spine Progress Note  Post Op day: 1 Day Post-Op    2019 7:42 AM     Gayle Bell    Vital Signs:    Patient Vitals for the past 8 hrs:   BP Temp Pulse Resp SpO2   19 0406 117/75 98.1 °F (36.7 °C) 75 16 97 %   19 0047 106/59 98.1 °F (36.7 °C) 90 16 97 %     Temp (24hrs), Av.8 °F (36.6 °C), Min:97.3 °F (36.3 °C), Max:98.1 °F (36.7 °C)      Intake/Output:  No intake/output data recorded.  190 -  0700  In: 1400 [I.V.:1400]  Out:  [Urine:1950]    Pain Control:   Pain Assessment  Pain Scale 1: Numeric (0 - 10)  Pain Intensity 1: 2  Pain Onset 1: postop  Pain Location 1: Back  Pain Orientation 1: Lower  Pain Description 1: Aching  Pain Intervention(s) 1: Encouraged PCA    LAB:    Recent Labs     19  0340   HGB 13.5     Lab Results   Component Value Date/Time    Sodium 141 2019 03:40 AM    Potassium 4.2 2019 03:40 AM    Chloride 107 2019 03:40 AM    CO2 27 2019 03:40 AM    Glucose 135 (H) 2019 03:40 AM    BUN 11 2019 03:40 AM    Creatinine 0.85 2019 03:40 AM    Calcium 8.0 (L) 2019 03:40 AM       Subjective:  Gayle Bell is a 40 y.o. male s/p a  Procedure(s):  L5-S1 DECOMPRESSION AND FUSION WITH MAZOR   Procedure(s):  L5-S1 DECOMPRESSION AND FUSION WITH MAZOR. Tolerating diet. Objective: General: alert, cooperative, no distress. Gastrointestinal:  Soft, non-tender. Neurological: Neurovascular exam within normal limits. Sensation stable. Motor: unchanged C5-T1 and L2-S1. Foot drop resolved  Musculoskeletal:  Victor Manuel's sign negative in bilateral lower extremities. Calves soft, supple, non-tender upon palpation or with passive stretch. Skin: Incision - clean, dry and intact. No significant erythema or swelling.     Dressing: clean, dry, and intact     PT/OT:   Gait:                      Assessment:    s/p Procedure(s):  L5-S1 DECOMPRESSION AND FUSION WITH KEYUR    Active Problems:    Spinal stenosis of lumbar region (7/31/2019)         Plan:     1. Continue PT/OT  2. Continue established methods of pain control  3. VTE Prophylaxes - TEDS &/or SCDs              Discharge To:   Home when passes PT    Signed By: Renaldo Arnold MD

## 2019-08-01 NOTE — DISCHARGE INSTRUCTIONS
After Hospital Care Plan:  Discharge Instructions Lumbar Fusion Surgery   Dr. Soo Forman     Patient Name: Rayne Mcgowan    Date of procedure: 7/31/2019      Date of discharge: 8/1/2019    Procedure: Procedure(s):  L5-S1 DECOMPRESSION  [facility]      PCP: Meli Donahue MD      Follow up appointments  -follow up with Dr. Soo Forman in 2 weeks. Call [phone] to make an appointment as soon as you get home from the hospital.    When to call your Orthopaedic Surgeon:  -Signs of infection-if your incision is red; continues to have drainage; drainage has a foul odor or if you have a persistent fever over 101 degrees for 24 hours  -Nausea or vomiting, severe headache  -Loss of bowel or bladder function, inability to urinate  -Changes in sensation in your arms or legs (numbness, tingling, loss of color)  -Increased weakness-greater than before your surgery  -Severe pain or pain not relieved by medications  -Signs of a blood clot in your leg-calf pain, tenderness, redness, swelling of lower leg    When to call your Primary Care Physician:  -Concerns about medical conditions such as diabetes, high blood pressure, asthma, congestive heart failure  -Call if blood sugars are elevated, persistent headache or dizziness, coughing or congestion, constipation or diarrhea, burning with urination, abnormal heart rate    When to call 611 and go to the nearest emergency room:  -Acute onset of chest pain, shortness of breath, difficulty breathing    Activity  -You are going home a well person, be as active as possible. Your only exercise should be walking. Start with short frequent walks and increase your walking distance each day.  -Limit the amount of time you sit to 20-30 minute intervals.   Sitting for prolonged periods of time will be uncomfortable for you following surgery.  -Do NOT lift anything over 5 pounds  -Do NOT do any straining, twisting or bending  -When you are in bed, you may lay on your back or on either side. Do NOT lie on your stomach    Brace  -If you have a back brace, you should wear your brace at all times when you are out of bed. Do not wear the brace while in bed or showering.  -Remember to always wear a cotton t-shirt underneath your brace.  -Do not bend or twist when your brace is off    Diet  -Resume usual diet; drink plenty of fluids; eat foods high in fiber  -It is important to have regular bowel movements. Pain medications may cause constipation. You may want to take a stool softener (such as Senokot-S or Colace) to prevent constipation.   -If constipation occurs, take a laxative (such as Dulcolax tablets, Milk of Magnesia, or a suppository). Laxatives should only be used if the above preventable measures have failed and you still have not had a bowel movement after three days    Driving  -You may not drive or return to work until instructed by your physician. However, you may ride in the car for short periods of time. Incision Care  -You may take brief showers but do not run the water run directly onto the wound. After showering or bathing, remove the wet dressing and gently blot the wound dry with a soft towel.  -Do not rub or apply any lotions or ointments to your incision site.   -Do not soak or scrub your wound  -Keep a dry dressing (ABD and paper tape) on your incision and have it changed daily for 14 days after surgery; more often if your incision is draining. Have your caregiver wash their hands thoroughly before changing your dressing.  -You will have absorbable sutures and steristrips (white tape) on your incision. Leave the steristrips on until they fall off. Showering  -You may shower in approximately 4 days after your surgery.    -Leave the dressing on during your shower. Do NOT allow the water to run directly onto your dressing. Once you get out of the shower, put on a dry dressing.  -Reminder- your brace can be removed while showering.  Remember to not bend or twist while your brace is off.    -Do not take a tub bath. Preventing blood clots  -You have been given T.E.D. stockings to wear. Continue to wear these for 7 days after your discharge. Put them on in the morning and take them off at night.    -They are used to increase your circulation and prevent blood clots from forming in your legs  -T. E.D. stockings can be machine washed, temperature not to exceed 160° F (71°C) and machine dried for 15 to 20 minutes, temperature not to exceed 250° F (121°C). Pain management  -Take pain medication as prescribed; decrease the amount you use as your pain lessens  -DO not wait until you are in extreme pain to take your medication.  -Avoid alcoholic beverages while taking pain medication    Pain Medication Safety  DO:  -Read the Medication Guide   -Take your medicine exactly as prescribed   -Store your medicine away from children and in a safe place   -Flush unused medicine down the toilet   -Call your healthcare provider for medical advice about side effects. You may report side effects to FDA at 7-565-FDA-5439.   -Please be aware that many medications contain Tylenol. We do not want you to over medicate so please read the information below as a guide. Do not take more than 4 Grams of Tylenol in a 24 hour period.   (There are 1000 milligrams in one Gram)                                                                                                                                                                                                                                                                                                                                                                  Percocet contains 325 mg of Tylenol per tablet (do not take more than 12 tablets in 24 hours)  Lortab contains 500 mg of Tylenol per tablet (do not take more than 8 tablets in 24 hours)  Norco contains 325 mg of Tylenol per tablet (do not take more than 12 tablets in 24 hours). DO NOT:  -Do not give your medicine to others   -Do not take medicine unless it was prescribed for you   -Do not stop taking your medicine without talking to your healthcare provider   -Do not break, chew, crush, dissolve, or inject your medicine. If you cannot swallow your medicine whole, talk to your healthcare provider.  -Do not drink alcohol while taking this medicine  -Do not take anti-inflammatory medications or aspirin unless instructed by your      Physician.

## 2019-08-01 NOTE — PROGRESS NOTES
Problem: Self Care Deficits Care Plan (Adult)  Goal: *Acute Goals and Plan of Care (Insert Text)  Description  FUNCTIONAL STATUS PRIOR TO ADMISSION: Patient was independent without use of DME, however reported dragging his left foot/dragging himself around    HOME SUPPORT: The patient lived with wife but did not require assist.    Occupational Therapy Goals  Initiated 8/1/2019    1. Patient will perform lower body dressing with modified independence using AE PRN within 4 days. 2.  Patient will perform toileting with modified independence using most appropriate DME within 4 days. 3.  Patient will perform toileting and toilet transfer at modified independence within 4 days. 4.  Patient will don/doff back brace at modified independence within 4 days. 5.  Patient will verbalize/demonstrate 3/3 back precautions during ADL tasks without cues within 4 days. OCCUPATIONAL THERAPY EVALUATION  Patient: Melba Alva [de-identified]40 y.o. male)  Date: 8/1/2019  Primary Diagnosis: Spinal stenosis of lumbar region [M48.061]  Procedure(s) (LRB):  L5-S1 DECOMPRESSION AND FUSION WITH MAZOR (N/A) 1 Day Post-Op   Precautions: back, brace when out of bed       ASSESSMENT :  Based on the objective data described below, patient presents with Setup and Contact guard assistance upper body ADLs, Minimum assistance and Additional time lower body ADLs, and Contact guard assistance functional mobility. Next visit: recommend continued instruction on use of hip kit for LE dressing    The following are barriers to ADL independence while in acute care:   - Cognitive and/or behavioral: none   - Medical condition: precautions and pain tolerance    - Other:       Patient will benefit from skilled acute intervention to address the above impairments.   Patients rehabilitation potential is considered to be Good    Discharge recommendations: Home health (to increase independence and safety)  None  If above is not an option then recommend: None    Barriers to discharging home, in addition to above listed impairments: none. Equipment recommendations for successful discharge (if) home: patient interested in and wife looking into purchasing raised toilet seat with rails and hip kit      PLAN :  Recommendations and Planned Interventions: self care training, functional mobility training, therapeutic exercise, balance training, therapeutic activities, endurance activities, patient education, home safety training and family training/education    Frequency/Duration: Patient will be followed by occupational therapy 5 times a week to address goals. SUBJECTIVE:   Patient stated I'm glad you showed me that.  re: technique for rinsing mouth after brushing teeth    OBJECTIVE DATA SUMMARY:   HISTORY:   Past Medical History:   Diagnosis Date    Morbid obesity with BMI of 40.0-44.9, adult ([address] 7/25/2019     Past Surgical History:   Procedure Laterality Date    HX APPENDECTOMY      HX CHOLECYSTECTOMY  2003    LAPAROSCOPIC    [place]    HX HEENT  2001    [facility]us surgery ([facility]    HX HEENT  2011    LASIK EYE SX.    HX HERNIA REPAIR  1975    HX UROLOGICAL Right 1992    kidney stone       Prior Level of Function/Environment/Context: independent, limited by pain and left foot drop and reported dragging his left leg around    [address]: Apartment  # Steps to Enter: 25  Rails to Enter: Yes  Hand Rails : Bilateral  One/Two Story Residence: One story  Living Alone: No  Support Systems: Spouse/Significant Other/Partner  Patient Expects to be Discharged to[de-identified] Apartment  Current DME Used/Available at Home: None    Hand dominance: Right    EXAMINATION OF PERFORMANCE DEFICITS:  Cognitive/Behavioral Status:  Neurologic State: Alert  Orientation Level: Oriented X4  Cognition: Appropriate decision making; Appropriate safety awareness; Follows commands  Perception: Appears intact  Perseveration: No perseveration noted  Safety/Judgement: Awareness of environment; Fall prevention;Home safety; Insight into deficits    Skin: dressing and drain intact    Edema: none noted, thigh high teds in place    Hearing: Auditory  Auditory Impairment: None      Range of Motion:  WFL bilateral UEs        Strength:  Strength: Generally decreased, functional        Coordination:  Coordination: Within functional limits  Fine Motor Skills-Upper: Left Intact; Right Intact    Gross Motor Skills-Upper: Left Intact; Right Intact    Tone & Sensation:  Tone: Normal  Sensation: Intact     Balance:  Sitting: Intact  Standing: Intact    Functional Mobility and Transfers for ADLs:  Bed Mobility:  Supine to Sit: Minimum assistance;Assist x1;Additional time; Adaptive equipment  Sit to Supine: Minimum assistance; Additional time    Transfers:  Sit to Stand: Minimum assistance;Assist x1;Additional time; Adaptive equipment  Stand to Sit: Minimum assistance;Assist x1  Bed to Chair: Contact guard assistance  Bathroom Mobility: Contact guard assistance  Toilet Transfer : Contact guard assistance; Other (comment)(educated on use of raised toilet seat with rails)    ADL Assessment:  Feeding: Supervision    Oral Facial Hygiene/Grooming: Supervision; Other (comment)(instructed on compensatory technique standing at sink)    Bathing: Minimum assistance; Moderate assistance    Upper Body Dressing: Supervision;Setup    Lower Body Dressing: Minimum assistance; Moderate assistance; Adaptive equipment; Other (comment)(initiated instruction on use of hip kit and where to purchas)    Toileting: Contact guard assistance;Minimum assistance        ADL Intervention and task modifications:        Educated on role of OT, back precautions, techniques to adhere to precautions during ADL's and ADL mobility/transfers, home modifications, optimal sitting, body mechanics for sit to stand and stand to sit, use of hip kit for LE dressing, safe footwear for fall prevention and improved support, potential equipment needs in bathroom,  educated on positioning in supine and sidelying to facilitate neutral alignment and increased comfort, provided visual demonstration to increase carryover    Educated on positioning of thigh high teds and needed cues to pull over thighs due to pooling at knee    Instructed and trialed sock-aide, dressing stick and reacher for LE dressing, educated on where to purchase    Patient instructed and indicated understanding the benefits of maintaining activity tolerance, functional mobility, and independence with self care tasks during acute stay  to ensure safe return home and to baseline. Encouraged patient to increase frequency and duration OOB, be out of bed for all meals, perform daily ADLs (as approved by RN/MD regarding bathing etc), and performing functional mobility to/from bathroom. Cognitive Retraining  Safety/Judgement: Awareness of environment; Fall prevention;Home safety; Insight into deficits       Functional Measure:  Barthel Index:    Bathin  Bladder: 10  Bowels: 10  Groomin  Dressin  Feeding: 10  Mobility: 10  Stairs: 5  Toilet Use: 5  Transfer (Bed to Chair and Back): 10  Total: 70/100        Percentage of impairment   0%   1-19%   20-39%   40-59%   60-79%   80-99%   100%   Barthel Score 0-100 100 99-80 79-60 59-40 20-39 1-19   0     The Barthel ADL Index: Guidelines  1. The index should be used as a record of what a patient does, not as a record of what a patient could do. 2. The main aim is to establish degree of independence from any help, physical or verbal, however minor and for whatever reason. 3. The need for supervision renders the patient not independent. 4. A patient's performance should be established using the best available evidence. Asking the patient, friends/relatives and nurses are the usual sources, but direct observation and common sense are also important. However direct testing is not needed.   5. Usually the patient's performance over the preceding 24-48 hours is important, but occasionally longer periods will be relevant. 6. Middle categories imply that the patient supplies over 50 per cent of the effort. 7. Use of aids to be independent is allowed. Lauryn Riddle., Barthel, D.W. ([id number]). Functional evaluation: the Barthel Index. [facility] (14)2. FREDERICK Cortes, Jennifer Little., Gumaro Ayala., [facility], [address] [place] [address] (1999). Measuring the change indisability after inpatient rehabilitation; comparison of the responsiveness of the Barthel Index and Functional [place] Measure. Journal of Neurology, Neurosurgery, and Psychiatry, 66(4), 271-360. Trivedi Lisa, N.J.A, RADHA Macias, & Almaz Fisher M.A. (2004.) Assessment of post-stroke quality of life in cost-effectiveness studies: The usefulness of the Barthel Index and the EuroQoL-5D. Quality of Life Research, 15, 506-47         Occupational Therapy Evaluation Charge Determination   History Examination Decision-Making   LOW Complexity : Brief history review  LOW Complexity : 1-3 performance deficits relating to physical, cognitive , or psychosocial skils that result in activity limitations and / or participation restrictions  LOW Complexity : No comorbidities that affect functional and no verbal or physical assistance needed to complete eval tasks       Based on the above components, the patient evaluation is determined to be of the following complexity level: LOW   Pain:  No significant complaint, noted increase during bed mobility  Activity Tolerance:   Good  Please refer to the flowsheet for vital signs taken during this treatment. After treatment patient left:   Supine in bed  Call light within reach  RN notified  Family at bedside  Side rails x 3   COMMUNICATION/EDUCATION:   The patients plan of care was discussed with: Physical Therapist and Registered Nurse. Home safety education was provided and the patient/caregiver indicated understanding.  and Patient/family have participated as able in goal setting and plan of care. This patients plan of care is appropriate for delegation to ARIELLE.     Thank you for this referral.  Kartik Cortes, OTR/L  Time Calculation: 31 mins

## 2019-08-01 NOTE — OP NOTES
[address]  OPERATIVE REPORT    Name:  Brandyn Mcgowan  MR#:  [id number]  :  1975  ACCOUNT #:  [de-identified]  DATE OF SERVICE:  2019      PREOPERATIVE DIAGNOSES:  1. Spinal stenosis, lumbar spine. 2.  Retrolisthesis, L5-S1.  3.  Significant foraminal stenosis with foraminal disk herniation, L5-S1. POSTOPERATIVE DIAGNOSIS:  same    PROCEDURE PERFORMED:  1. Laminectomy, decompression, and far lateral diskectomy at L5-S1.  2.  Laminectomy, left-sided, and decompression at L4-L5 to find and decompress the L5 nerve root before it traveled and went around the pedicle at L5.  3.  Posterolateral fusion, L5-S1.  4.  Interbody fusion using left-sided approach, L5-S1.  5. Interbody cage, L5-S1 using 10 mm C-spine cage. 6.  Placement of nonsegmental instrumentation using [employer] pedicle screws. 7.  Use of [employer] robotics for pedicle screw placement. 8.  Bone marrow aspirate of right hip.  9.  Use of local autograft bone. 10.  Placement of interbody cage, 10 mm C-spine cage. SURGEON:  Rosemary Humphrey MD    ASSISTANT:  SABIHA Mejia    ANESTHESIA:  General.    COMPLICATIONS:  None. SPECIMENS REMOVED:  None. IMPLANTS USED:  [employer] pedicle screws. ESTIMATED BLOOD LOSS:  200 mL. INDICATIONS FOR PROCEDURE:  The patient is a pleasant male with history of footdrop and significant nerve dysfunction and numbness of the left leg. He had severe left-sided foraminal stenosis with foraminal disk herniation and stenosis and retrolisthesis. After discussing the risks and benefits of surgery, he elected to proceed with decompression of this foramina completely with fusion.   He understood the risks including nerve damage, infection, being no better, being worse, continued pain, blood loss, infection, CSF leak, adjacent segment disease, failure of the hardware, nonunion, being no better, being worse, and all the risks related to this procedure and he elected to go forward. PROCEDURE:  The patient was laid prone on the operating room table and prepped and draped in normal fashion using alcohol and DuraPrep. A skin incision was made. Soft tissue was dissected down to the spinous processes from L4-S1. Soft tissues were then brought over the sacral ala and transverse process at L5. Screws were placed using the "[employer]" robotic system. All screws required 20 milliamps of EMG stimulation and looked perfect on the AP and lateral views. All screws had good purchase. They were 6.5 mm at L5 and 7.5 mm at S1. These were [employer] pedicle screws. After this, laminectomy was done at L5-S1 left-sided with medial facetectomy and then a foraminotomy was done and the whole facet joint was removed. There was a large far lateral disk herniation, which was removed. The nerve root was then found around the L4-L5 interspace to make sure that there was no pressure on the nerve. This was a laminotomy done at L4-L5 to find the shoulder of the L5 nerve root and this was dissected up and around the pedicle and then out the foramen at L5-S1 using high-speed sandee and Kerrison punches. At this point, the spine was completely decompressed from L4-L5 to L5-S1. Diskectomy was done. Endplates were rasped to bleeding parallel bone. Bone marrow aspirate was taken from the right hip. This was mixed with the allograft bone and autograft bone and then the disk space was filled and the cage was placed behind the graft, 10 mm C-spine Laura cage, which fit well. At this time, the wounds were irrigated with Betadine and bacitracin solution. The lateral gutters were decorticated. Bone graft was placed, which was a mixture of allograft, autograft, and bone marrow aspirate and bone graft substitute. This was placed in the posterior lateral gutters. The rods were placed. Caps were torqued to an audible click. A deep drain was placed. The fascia was closed with #1 Vicryl.   The skin was closed with 2-0 Vicryl, 3-0 Vicryl, and Dermabond. A gram of vancomycin was placed in the wound before closure. There were no complications to the procedure. I, Dr. Dmitriy Walls, did the procedure myself with the help of Prerna Irwin, who helped with exposure, decompression, fusion, and closure. All screws required 20 milliamps of EMG stimulation.         Fatou Louise MD      TS/CLARISSE_SEDAH_I/CLARISSE_MARIE_P  D:  07/31/2019 14:47  T:  07/31/2019 23:21  JOB #:  [id number]

## 2019-08-13 NOTE — DISCHARGE SUMMARY
[address] [place] [address]   [address]  [phone]     Discharge Summary       PATIENT ID: Joanne Mckeon  MRN: [id number]   YOB: 1975    DATE OF ADMISSION: 7/31/2019  9:02 AM    DATE OF DISCHARGE: 8/1/2019   PRIMARY CARE PROVIDER: Ruby Barrios MD     CONSULTATIONS: None    PROCEDURES/SURGERIES: Procedure(s):  L5-S1 DECOMPRESSION AND FUSION WITH MAZOR    History of Present Illness:  Joanne Mckeon is a 40 y.o. male with prior medical history notable for morbid obesity and chronic back pain. He developed diffuse numbness in the L5 distribution in the left lower extremity and a foot drop. Imaging confirmed retrolisthesis at L5-S1 with foraminal disc herniation at this level. After failing conservative therapy and a discussion of the risks, benefits, alternatives, perioperative course, and potential complications of surgery, he consented to undergo a Procedure(s):  L5-S1 DECOMPRESSION AND FUSION WITH MAZOR. Hospital Course:  Joanne Mckeon tolerated the procedure well. He was transferred  to the recovery room in stable condition. After a brief stay the patient was then transferred to the Spinal Surgery Unit at [address] [place] [address].  On postoperative day #1, the dressing was clean and dry, he was neurovascularly intact. The patient was afebrile and vital signs were stable. Calves were soft and non-tender bilaterally. He was transitioned from the PCA to oral pain medicine. The patient was tolerating a regular diet and making slow progress with physical therapy. His rosado was removed and he was voiding without difficulty. His hemovac was removed on the afternoon of POD#1.     Hemoglobin prior to discharge were   Lab Results   Component Value Date/Time    HGB 13.5 08/01/2019 03:40 AM        Fish Felipe made satisfactory progress with physical therapy and was discharged to Home with home health in stable condition on postoperative day 1. He was provided with routine postoperative instructions and advised to follow up with Jolene Cam MD in 2 weeks following discharge from the hospital.      FOLLOW UP APPOINTMENTS:    Follow-up Information     Follow up With Specialties Details Why Contact Info    Preston Hicks MD [place] [address] [place]  Hadley Gambino  [phone]             DISCHARGE MEDICATIONS:  Discharge Medication List as of 8/1/2019  2:24 PM      START taking these medications    Details   oxyCODONE IR (ROXICODONE) 5 mg immediate release tablet Take 1-2 Tabs by mouth every four (4) hours as needed for Pain for up to 5 days. Max Daily Amount: 60 mg., Print, Disp-60 Tab, R-0      naloxone (NARCAN) 4 mg/actuation nasal spray Use 1 spray intranasally, then discard. Repeat with new spray every 2 min as needed for opioid overdose symptoms, alternating nostrils. , Normal, Disp-1 Each, R-0      senna-docusate (PERICOLACE) 8.6-50 mg per tablet Take 1 Tab by mouth two (2) times a day. Indications: constipation, Print, Disp-30 Tab, R-0         CONTINUE these medications which have NOT CHANGED    Details   naproxen sodium (ALEVE) 220 mg cap Take  by mouth as needed., Historical Med      docosahexanoic acid/epa (FISH OIL PO) Take 1,200 mg by mouth daily. , Historical Med             CHRONIC MEDICAL DIAGNOSES:  Problem List as of 8/1/2019 Date Reviewed: 8/1/2019          Codes Class Noted - Resolved    Spinal stenosis of lumbar region ICD-10-CM: M48.061  ICD-9-CM: 724.02  7/31/2019 - Present        Morbid obesity with BMI of 40.0-44.9, adult [facility]) ICD-10-CM: E66.01, Z68.41  ICD-9-CM: 278.01, V85.41  7/25/2019 - Present              Signed:   Yarelis Henson NP  8/13/2019  12:58 PM

## 2019-12-10 ENCOUNTER — APPOINTMENT (OUTPATIENT)
Dept: GENERAL RADIOLOGY | Age: 44
End: 2019-12-10
Attending: EMERGENCY MEDICINE
Payer: OTHER MISCELLANEOUS

## 2019-12-10 ENCOUNTER — HOSPITAL ENCOUNTER (EMERGENCY)
Age: 44
Discharge: HOME | End: 2019-12-10
Attending: EMERGENCY MEDICINE | Admitting: EMERGENCY MEDICINE
Payer: OTHER MISCELLANEOUS

## 2019-12-10 VITALS
DIASTOLIC BLOOD PRESSURE: 82 MMHG | RESPIRATION RATE: 16 BRPM | TEMPERATURE: 98.5 F | SYSTOLIC BLOOD PRESSURE: 121 MMHG | HEART RATE: 77 BPM | OXYGEN SATURATION: 97 %

## 2019-12-10 DIAGNOSIS — S99.192A CLOSED FRACTURE OF BASE OF FIFTH METATARSAL BONE OF LEFT FOOT AT METAPHYSEAL-DIAPHYSEAL JUNCTION, INITIAL ENCOUNTER: ICD-10-CM

## 2019-12-10 PROCEDURE — 99283 EMERGENCY DEPT VISIT LOW MDM: CPT

## 2019-12-10 PROCEDURE — 75810000053 HC SPLINT APPLICATION

## 2019-12-10 PROCEDURE — L4360 PNEUMAT WALKING BOOT PRE CST: HCPCS

## 2019-12-10 PROCEDURE — 74011250637 HC RX REV CODE- 250/637: Performed by: EMERGENCY MEDICINE

## 2019-12-10 PROCEDURE — 73630 X-RAY EXAM OF FOOT: CPT

## 2019-12-10 RX ORDER — IBUPROFEN 600 MG/1
600 TABLET ORAL
Status: COMPLETED | OUTPATIENT
Start: 2019-12-10 | End: 2019-12-10

## 2019-12-10 RX ORDER — TRAMADOL HYDROCHLORIDE 50 MG/1
50 TABLET ORAL
Qty: 10 TAB | Refills: 0 | Status: SHIPPED | OUTPATIENT
Start: 2019-12-10 | End: 2019-12-19

## 2019-12-10 RX ADMIN — IBUPROFEN 600 MG: 600 TABLET ORAL at 12:56

## 2019-12-10 NOTE — PROGRESS NOTES
PT--APPL OF UNNA BOOT ONE TIME        PT order acknowledged and completed. Boot and crutch training provided by EMT. Thanks!     Maryann De Paz PT, DPT  Geriatric Clinical Specialist

## 2019-12-10 NOTE — DISCHARGE INSTRUCTIONS
Patient Education        Broken Foot: Care Instructions  Your Care Instructions    A broken foot, or foot fracture, is a break in one or more of the bones in your foot. It may happen because of a sports injury, a fall, or other accident. A compound, or open, fracture occurs when a bone breaks through the skin. A break that does not poke through the skin is a closed fracture. Your treatment depends on the location and type of break in your foot. You may need a splint, a cast, or an orthopedic shoe. Certain kinds of injuries may need surgery at some time. Whatever your treatment, you can ease symptoms and help your foot heal with care at home. You may need 6 to 8 weeks or more to fully heal.  You heal best when you take good care of yourself. Eat a variety of healthy foods, and don't smoke. Follow-up care is a key part of your treatment and safety. Be sure to make and go to all appointments, and call your doctor if you are having problems. It's also a good idea to know your test results and keep a list of the medicines you take. How can you care for yourself at home? · Be safe with medicines. Take pain medicines exactly as directed. ? If the doctor gave you a prescription medicine for pain, take it as prescribed. ? If you are not taking a prescription pain medicine, ask your doctor if you can take an over-the-counter medicine. · Leave the splint on until your follow-up appointment. Do not put any weight on the injured foot. If you were given crutches, use them as directed. · Put ice or a cold pack on your foot for 10 to 20 minutes at a time. Try to do this every 1 to 2 hours for the next 3 days (when you are awake) or until the swelling goes down. Put a thin cloth between the ice and your skin. · Prop up the sore foot on a pillow anytime you sit or lie down during the next 3 days. Try to keep it above the level of your heart. This will help reduce swelling.   · Follow the cast care instructions your doctor gives you. If you have a splint, do not take it off unless your doctor tells you to. Cast and splint care  · If you have a removable splint, ask your doctor if it is okay to remove it to bathe. Your doctor may want you to keep it on as much as possible. · Keep your plaster splint covered by taping a sheet of plastic around it when you bathe. Water under the plaster can cause your skin to itch and hurt. · Never cut your splint. When should you call for help? Call 911 anytime you think you may need emergency care. For example, call if:    · You have chest pain, are short of breath, or you cough up blood.    Call your doctor now or seek immediate medical care if:    · You have new or worse pain.     · Your foot is cool or pale or changes color.     · You have tingling, weakness, or numbness in your toes.     · Your cast or splint feels too tight.     · You have signs of a blood clot in your leg (called a deep vein thrombosis), such as:  ? Pain in your calf, back of the knee, thigh, or groin. ? Redness or swelling in your leg.    Watch closely for changes in your health, and be sure to contact your doctor if:    · You have a problem with your splint or cast.     · You do not get better as expected. Where can you learn more? Go to http://chema-elizabeth.info/. Enter W489 in the search box to learn more about \"Broken Foot: Care Instructions. \"  Current as of: June 26, 2019  Content Version: 12.2  © 7302-2702 [employer]. Care instructions adapted under license by "[employer] (which disclaims liability or warranty for this information). If you have questions about a medical condition or this instruction, always ask your healthcare professional. [address] any warranty or liability for your use of this information.

## 2019-12-10 NOTE — ED TRIAGE NOTES
C/o left foot pain after tripping and falling at work this morning ~1015. Denies hitting head. Pain to left lateral foot. Denies taking OTC medication for relief. Ambulatory with limp in triage.

## 2019-12-10 NOTE — ED PROVIDER NOTES
Please note that this dictation was completed with [employer], the computer voice recognition software.  Quite often unanticipated grammatical, syntax, homophones, and other interpretive errors are inadvertently transcribed by the computer software.  Please disregard these errors.  Please excuse any errors that have escaped final proofreading. 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 Denies LOC was able to ambulate afterwards but he has pain to the lateral aspect of his left foot. There was no bleeding no other complaints currently patient has not taken anything for his pain.     Social History    Socioeconomic History      Marital status:       Spouse name: Not on file      Number of children: Not on file      Years of education: Not on file      Highest education level: Not on file    Occupational History      Not on file    Social Needs      Financial resource strain: Not on file      Food insecurity:        Worry: Not on file        Inability: Not on file      Transportation needs:        Medical: Not on file        Non-medical: Not on file    Tobacco Use      Smoking status: Never Smoker      Smokeless tobacco: Never Used    Substance and Sexual Activity      Alcohol use: Not Currently        Frequency: Never      Drug use: Never      Sexual activity: Not on file    Lifestyle      Physical activity:        Days per week: Not on file        Minutes per session: Not on file      Stress: Not on file    Relationships      Social connections:        Talks on phone: Not on file        Gets together: Not on file        Attends Oriental orthodox service: Not on file        Active member of club or organization: Not on file        Attends meetings of clubs or organizations: Not on file        Relationship status: Not on file      Intimate partner violence:        Fear of current or ex partner: Not on file Emotionally abused: Not on file        Physically abused: Not on file        Forced sexual activity: Not on file    Other Topics      Concerns:        Not on file    Social History Narrative      Not on file             Past Medical History:   Diagnosis Date    Morbid obesity with BMI of 40.0-44.9, adult ([facility] [address] 7/25/2019       Past Surgical History:   Procedure Laterality Date    HX APPENDECTOMY      HX CHOLECYSTECTOMY  2003    LAPAROSCOPIC    HX GASTRIC [place]  [place]    [place]  [place]    HX HEENT  2011    LASIK EYE SX.    HX HERNIA REPAIR  1975    HX UROLOGICAL Right 1992    kidney stone         Family History:   Problem Relation Age of Onset    Heart Disease Mother         CABG    Heart Disease Father         CABG   Valri Jermain Anesth Problems Neg Hx        Social History     Socioeconomic History    Marital status:      Spouse name: Not on file    Number of children: Not on file    Years of education: Not on file    Highest education level: Not on file   Occupational History    Not on file   Social Needs    Financial resource strain: Not on file    Food insecurity:     Worry: Not on file     Inability: Not on file    Transportation needs:     Medical: Not on file     Non-medical: Not on file   Tobacco Use    Smoking status: Never Smoker    Smokeless tobacco: Never Used   Substance and Sexual Activity    Alcohol use: Not Currently     Frequency: Never    Drug use: Never    Sexual activity: Not on file   Lifestyle    Physical activity:     Days per week: Not on file     Minutes per session: Not on file    Stress: Not on file   Relationships    Social connections:     Talks on phone: Not on file     Gets together: Not on file     Attends Congregational service: Not on file     Active member of club or organization: Not on file     Attends meetings of clubs or organizations: Not on file     Relationship status: Not on file    Intimate partner violence: Fear of current or ex partner: Not on file     Emotionally abused: Not on file     Physically abused: Not on file     Forced sexual activity: Not on file   Other Topics Concern    Not on file   Social History Narrative    Not on file         ALLERGIES: Patient has no known allergies. Review of Systems   Musculoskeletal: Positive for arthralgias, gait problem and myalgias. All other systems reviewed and are negative. Vitals:    12/10/19 1155   BP: 121/82   Pulse: 77   Resp: 16   Temp: 98.5 °F (36.9 °C)   SpO2: 97%            Physical Exam  Vitals signs and nursing note reviewed. Constitutional:       General: He is not in acute distress. Appearance: Normal appearance. He is well-developed. He is obese. Comments: Hobbling, NAD, AxOx4, speaking in complete sentences     HENT:      Head: Normocephalic and atraumatic. Mouth/Throat:      Pharynx: No oropharyngeal exudate. Eyes:      General:         Right eye: No discharge. Left eye: No discharge. Extraocular Movements: Extraocular movements intact. Conjunctiva/sclera: Conjunctivae normal.      Pupils: Pupils are equal, round, and reactive to light. Neck:      Musculoskeletal: Normal range of motion and neck supple. Cardiovascular:      Rate and Rhythm: Normal rate and regular rhythm. Heart sounds: No murmur. No friction rub. No gallop. Pulmonary:      Effort: Pulmonary effort is normal. No respiratory distress. Breath sounds: Normal breath sounds. No wheezing or rales. Chest:      Chest wall: No tenderness. Abdominal:      General: Bowel sounds are normal. There is no distension. Palpations: Abdomen is soft. There is no mass. Tenderness: There is no tenderness. There is no guarding or rebound. Musculoskeletal: Normal range of motion. General: Swelling, tenderness and signs of injury present. No deformity. Right lower leg: No edema. Left lower leg: No edema. Comments: L lateral foot - skin intact/ min edema/ hematoma; pt has distal motor/ CV/ Sensation grossly intact all 5 L toes/ rest of foot/ prox ankle/ leg nttp;    Lymphadenopathy:      Cervical: No cervical adenopathy. Skin:     General: Skin is warm and dry. Capillary Refill: Capillary refill takes less than 2 seconds. Coloration: Skin is not jaundiced or pale. Findings: No bruising, erythema, lesion or rash. Neurological:      General: No focal deficit present. Mental Status: He is alert and oriented to person, place, and time. Cranial Nerves: No cranial nerve deficit. Coordination: Coordination normal.          MDM       Procedures    1:12 PM  Pt told of fracture/ pending ortho  consult and agrees;      CONSULT  NOTE  1:36 PM  Michael Milton MD communicated via [place]  with Dr Yeni High. Specialty: Ortho  Discussed pt's hx, disposition, and available diagnostic and imaging results. Reviewed care plans. Consulting physician agrees with plans as outlined 'Boot/ crutches/ OP follow-up' . 1:37 PM  Fish Felipe's  results have been reviewed with him. He has been counseled regarding his diagnosis. He verbally conveys understanding and agreement of the signs, symptoms, diagnosis, treatment and prognosis and additionally agrees to Call/ Arrange follow up as recommended with Dr. Don Jeter MD in 24 - 48 hours. He also agrees with the care-plan and conveys that all of his questions have been answered. I have also put together some discharge instructions for him that include: 1) educational information regarding their diagnosis, 2) how to care for their diagnosis at home, as well a 3) list of reasons why they would want to return to the ED prior to their follow-up appointment, should their condition change or for concerns.

## (undated) DEVICE — STERILE POLYISOPRENE POWDER-FREE SURGICAL GLOVES WITH EMOLLIENT COATING: Brand: PROTEXIS

## (undated) DEVICE — PREP KIT PEEL PTCH POVIDONE IOD

## (undated) DEVICE — PREP SKN PREVAIL 40ML APPL --

## (undated) DEVICE — POSITIONER HD REST FOAM CMFRT TCH

## (undated) DEVICE — SUTURE VCRL SZ 3-0 L27IN ABSRB UD L24MM PS-1 3/8 CIR PRIM J936H

## (undated) DEVICE — DRAIN KT WND 10FR RND 400ML --

## (undated) DEVICE — GARMENT,MEDLINE,DVT,INT,CALF,MED, GEN2: Brand: MEDLINE

## (undated) DEVICE — SUTURE VCRL 2-0 L27IN ABSRB UD CP-2 L26MM 1/2 CIR REV CUT J869H

## (undated) DEVICE — SPONGE GZ W4XL4IN COT 12 PLY TYP VII WVN C FLD DSGN

## (undated) DEVICE — KIT ROBOTIC SPINE DISP MAZORX

## (undated) DEVICE — APPLICATOR BNDG 1MM ADH PREMIERPRO EXOFIN

## (undated) DEVICE — MEDI-VAC NON-CONDUCTIVE SUCTION TUBING: Brand: CARDINAL HEALTH

## (undated) DEVICE — BLADE ELECTRODE: Brand: EDGE

## (undated) DEVICE — INFECTION CONTROL KIT SYS

## (undated) DEVICE — SOLUTION IV 1000ML 0.9% SOD CHL

## (undated) DEVICE — TOOL 14MH30 LEGEND 14CM 3MM: Brand: MIDAS REX ™

## (undated) DEVICE — NEEDLE HYPO 22GA L1.5IN BLK POLYPR HUB S STL REG BVL STR

## (undated) DEVICE — FLOSEAL HEMOSTATIC MATRIX, 10 ML: Brand: FLOSEAL

## (undated) DEVICE — LAMINECTOMY RICHMOND-LF: Brand: MEDLINE INDUSTRIES, INC.

## (undated) DEVICE — SPONGE GZ W4XL4IN COT RADPQ HIGHLY ABSRB

## (undated) DEVICE — BIPOLAR FORCEPS CORD: Brand: VALLEYLAB

## (undated) DEVICE — 4-PORT MANIFOLD: Brand: NEPTUNE 2

## (undated) DEVICE — SUTURE VCRL 1 L27IN ABSRB VLT TP-1 L65MM 1/2 CIR TAPERPOINT J650G

## (undated) DEVICE — BONE MARROW KIT ASPIR 11 GA

## (undated) DEVICE — Device

## (undated) DEVICE — BONE WAX WHITE: Brand: BONE WAX WHITE

## (undated) DEVICE — TRAY CATH W/ 16FR CATH URIN M CTRL FIT OUTLT TB F STATLOK

## (undated) DEVICE — COVER,TABLE,HEAVY DUTY,60"X90",STRL: Brand: MEDLINE

## (undated) DEVICE — DRAPE C-ARMOUR C-ARM KIT --